# Patient Record
Sex: FEMALE | Race: WHITE | NOT HISPANIC OR LATINO
[De-identification: names, ages, dates, MRNs, and addresses within clinical notes are randomized per-mention and may not be internally consistent; named-entity substitution may affect disease eponyms.]

---

## 2023-05-01 ENCOUNTER — TRANSCRIPTION ENCOUNTER (OUTPATIENT)
Age: 58
End: 2023-05-01

## 2023-05-02 PROBLEM — Z00.00 ENCOUNTER FOR PREVENTIVE HEALTH EXAMINATION: Status: ACTIVE | Noted: 2023-05-02

## 2023-06-19 ENCOUNTER — APPOINTMENT (OUTPATIENT)
Dept: GASTROENTEROLOGY | Facility: CLINIC | Age: 58
End: 2023-06-19
Payer: COMMERCIAL

## 2023-06-19 ENCOUNTER — NON-APPOINTMENT (OUTPATIENT)
Age: 58
End: 2023-06-19

## 2023-06-19 VITALS
RESPIRATION RATE: 17 BRPM | SYSTOLIC BLOOD PRESSURE: 128 MMHG | HEIGHT: 64 IN | BODY MASS INDEX: 19.63 KG/M2 | WEIGHT: 115 LBS | OXYGEN SATURATION: 100 % | HEART RATE: 63 BPM | DIASTOLIC BLOOD PRESSURE: 71 MMHG

## 2023-06-19 DIAGNOSIS — H81.10 BENIGN PAROXYSMAL VERTIGO, UNSPECIFIED EAR: ICD-10-CM

## 2023-06-19 DIAGNOSIS — Z12.11 ENCOUNTER FOR SCREENING FOR MALIGNANT NEOPLASM OF COLON: ICD-10-CM

## 2023-06-19 PROCEDURE — 99204 OFFICE O/P NEW MOD 45 MIN: CPT

## 2023-06-19 RX ORDER — ESTRADIOL 10 UG/1
10 TABLET VAGINAL
Refills: 0 | Status: ACTIVE | COMMUNITY

## 2023-06-19 RX ORDER — ESTRADIOL 10 UG/1
10 TABLET VAGINAL
Qty: 24 | Refills: 0 | Status: ACTIVE | COMMUNITY
Start: 2023-04-03

## 2023-06-19 NOTE — PHYSICAL EXAM
[Alert] : alert [Sclera] : the sclera and conjunctiva were normal [Normal Appearance] : the appearance of the neck was normal [No Respiratory Distress] : no respiratory distress [None] : no edema [Abdomen Soft] : soft [Abnormal Walk] : normal gait [Skin Lesions] : no skin lesions [No Focal Deficits] : no focal deficits [Oriented To Time, Place, And Person] : oriented to person, place, and time [de-identified] : Deferred pending colonoscopy

## 2023-06-19 NOTE — ASSESSMENT
[FreeTextEntry1] : 1. Colon cancer screening: Colonoscopy scheduled\par \par 2. Pelvic bloating / fullness: CT scan ordered\par \par Pertinent available records reviewed\par Risks of the procedure including but not limited to bleeding / perforation / infection / anesthesia complication / missed polyp or lesion explained to the  patient . The patient expressed understanding and a desire to proceed with the procedure.\par \par Risk of not doing procedure includes but is not limited to missed or delayed diagnosis of gastrointestinal pathology.\par A consultation note was provided to the referring provider\par

## 2023-06-19 NOTE — HISTORY OF PRESENT ILLNESS
[FreeTextEntry1] : BRUCE DAUGHERTY  is being evaluated at the request of Dr. SHAYY Issa for an opinion re: lower pelvic bloating / pressure since 11/2022. Reportedly vaginal sonogram was unremarkable\par \par Denies nausea, vomiting, fever, chills, diarrhea, constipation, Melena. The sensation of bloating  / fullness alternates between left and right side. \par \par \par -Colonoscopy 11/2018:  hemorrhoids / diverticulosis

## 2023-06-19 NOTE — CONSULT LETTER
[Dear  ___] : Dear  [unfilled], [Consult Letter:] : I had the pleasure of evaluating your patient, [unfilled]. [Please see my note below.] : Please see my note below. [Consult Closing:] : Thank you very much for allowing me to participate in the care of this patient.  If you have any questions, please do not hesitate to contact me. [Sincerely,] : Sincerely, [FreeTextEntry3] : Armando Plummer MD\par tel: [phone]\par fax: [phone]\par

## 2023-07-14 ENCOUNTER — RESULT REVIEW (OUTPATIENT)
Age: 58
End: 2023-07-14

## 2023-07-14 DIAGNOSIS — R14.0 ABDOMINAL DISTENSION (GASEOUS): ICD-10-CM

## 2023-07-18 ENCOUNTER — NON-APPOINTMENT (OUTPATIENT)
Age: 58
End: 2023-07-18

## 2023-07-28 ENCOUNTER — APPOINTMENT (OUTPATIENT)
Dept: GASTROENTEROLOGY | Facility: HOSPITAL | Age: 58
End: 2023-07-28

## 2024-03-26 ENCOUNTER — NON-APPOINTMENT (OUTPATIENT)
Age: 59
End: 2024-03-26

## 2024-04-02 ENCOUNTER — APPOINTMENT (OUTPATIENT)
Dept: SURGERY | Facility: CLINIC | Age: 59
End: 2024-04-02
Payer: COMMERCIAL

## 2024-04-02 ENCOUNTER — NON-APPOINTMENT (OUTPATIENT)
Age: 59
End: 2024-04-02

## 2024-04-02 VITALS
OXYGEN SATURATION: 99 % | HEIGHT: 64 IN | TEMPERATURE: 98 F | WEIGHT: 116 LBS | DIASTOLIC BLOOD PRESSURE: 57 MMHG | HEART RATE: 60 BPM | BODY MASS INDEX: 19.81 KG/M2 | SYSTOLIC BLOOD PRESSURE: 105 MMHG

## 2024-04-02 DIAGNOSIS — Z78.0 ASYMPTOMATIC MENOPAUSAL STATE: ICD-10-CM

## 2024-04-02 DIAGNOSIS — Z78.9 OTHER SPECIFIED HEALTH STATUS: ICD-10-CM

## 2024-04-02 DIAGNOSIS — Z80.8 FAMILY HISTORY OF MALIGNANT NEOPLASM OF OTHER ORGANS OR SYSTEMS: ICD-10-CM

## 2024-04-02 DIAGNOSIS — Z81.8 FAMILY HISTORY OF OTHER MENTAL AND BEHAVIORAL DISORDERS: ICD-10-CM

## 2024-04-02 PROCEDURE — 99203 OFFICE O/P NEW LOW 30 MIN: CPT

## 2024-04-02 NOTE — ASSESSMENT
[FreeTextEntry1] : Referring physician Dr. Robert Issa  Date: 4/2/2024  Reason for referral abdominal bloating and pain  This is a 58-year-old female whose history dates back to 2022 at which point she underwent a vaginal ultrasound that was unremarkable.  An IUD was noted in place and that was removed in 2023.  The patient has been complaining of nonspecific lower abdominal bloating for this duration.  She was worked up by GI as well and ultimately underwent a colonoscopy by Dr. Plummer in 2023 the colonoscopy was unremarkable and demonstrated no carcinoma.  Patient was started on something that helped her with her bowels.  She is also currently on hormonal replacement once the IUD was removed.  Patient denies change in bowel habits no nausea no vomiting.  She denies feeling any lumps or bumps in the lower abdomen or groin regions.  Patient incidentally had a CAT scan for this problem back in 2023 and the CT was unremarkable  Physical examination patient examined erect and supine position.  Patient has no discrete inguinal hernias identified or femoral hernias.  She has no findings for suggesting a spigelian hernia as well.  She has no point tenderness in the lower abdominal wall.  Impression/plan no obvious inguinal hernias, abdominal bloating: This is a 58-year-old female with nonspecific abdominal bloating.  There is no obvious findings to suggest a hernia as the etiology of her nonspecific bloating and at this point I do not find any discrete inguinal or abdominal wall hernias as well.  I am glad this patient came for me to evaluate her rule out for obvious inguinal hernias.  I did this consultation I did spend a fair amount of time discussing with her that since she is on hormonal replacement and not on progesterone intermittently to return to see her GYN to discuss the pros and cons of continued estrogen therapy without progesterone breaks because her risk of endometrial cancer is elevated.  Patient understands and will return to her GYN for further discussion and hormonal management.  Thank you for allowing me to participate in the care of your patient.  Should you have any questions please feel free to give me a call directly.  Sincerely yours,

## 2024-04-16 PROBLEM — Z81.8 FAMILY HISTORY OF ANXIETY DISORDER: Status: ACTIVE | Noted: 2024-04-02

## 2024-04-16 PROBLEM — Z78.9 NON-SMOKER: Status: ACTIVE | Noted: 2024-04-02

## 2024-04-16 PROBLEM — Z80.8 FAMILY HISTORY OF GLIOBLASTOMA: Status: ACTIVE | Noted: 2024-04-02

## 2024-04-16 PROBLEM — Z78.0 MENOPAUSE: Status: ACTIVE | Noted: 2024-04-02
